# Patient Record
Sex: FEMALE | Race: BLACK OR AFRICAN AMERICAN | Employment: UNEMPLOYED | URBAN - METROPOLITAN AREA
[De-identification: names, ages, dates, MRNs, and addresses within clinical notes are randomized per-mention and may not be internally consistent; named-entity substitution may affect disease eponyms.]

---

## 2017-09-19 ENCOUNTER — HOSPITAL ENCOUNTER (EMERGENCY)
Age: 27
Discharge: HOME | End: 2017-09-19
Attending: INTERNAL MEDICINE | Admitting: INTERNAL MEDICINE
Payer: MEDICAID

## 2017-09-19 VITALS
BODY MASS INDEX: 25.19 KG/M2 | TEMPERATURE: 97.8 F | HEART RATE: 69 BPM | DIASTOLIC BLOOD PRESSURE: 64 MMHG | HEIGHT: 58 IN | SYSTOLIC BLOOD PRESSURE: 118 MMHG | WEIGHT: 120 LBS | OXYGEN SATURATION: 100 % | RESPIRATION RATE: 20 BRPM

## 2017-09-19 DIAGNOSIS — L30.9 ECZEMA, UNSPECIFIED TYPE: Primary | ICD-10-CM

## 2017-09-19 PROCEDURE — 99282 EMERGENCY DEPT VISIT SF MDM: CPT

## 2017-09-19 RX ORDER — PREDNISONE 20 MG/1
40 TABLET ORAL DAILY
Qty: 15 TAB | Refills: 0 | Status: SHIPPED | OUTPATIENT
Start: 2017-09-19 | End: 2017-09-29

## 2017-09-19 RX ORDER — LORATADINE 10 MG/1
TABLET ORAL
Qty: 14 TAB | Refills: 0 | Status: SHIPPED | OUTPATIENT
Start: 2017-09-19 | End: 2017-11-14

## 2017-09-19 NOTE — ED PROVIDER NOTES
[address]  EMERGENCY DEPARTMENT HISTORY AND PHYSICAL EXAM       Date: 9/19/2017   Patient Name: Radha Shetty   YOB: 1990  Medical Record Number: [id number]    History of Presenting Illness     Chief Complaint   Patient presents with    Skin Problem        History Provided By:  patient    Additional History: 4:24 PM  Radha Shetty is a 32 y.o. female with a PMHx of eczema (pt has had it her whole life, last dermatologist appointment was Dec 2015 last dermatologist appointment, dxed with scabies. P) who presents to the emergency department C/O worsening rash on the hands and feet. Her eczema sxs are worsening, extra dry, burning sensation (especially with water contact), and cracking skin. Sxs are exaccerbated by cold, sweat, certain detergents. 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 Pt rarely takes Benadryl for the sxs because it makes her drowsy, and was given topical cream for the sxs. Pt uses thick lotions, vaseline, and body butter for moisturizer. Patient denies nausea, vomiting, diarrhea, fever, chills, cough, and any other symptoms or complaints. Primary Care Provider: Angela Hernandez MD   Specialist:    Past History     Past Medical History:   Past Medical History:   Diagnosis Date    Anemia         Past Surgical History:   History reviewed. No pertinent surgical history. Family History:   Family History   Problem Relation Age of Onset    Asthma Sister     Diabetes Maternal Aunt     Lung Disease Maternal Uncle     Stroke Maternal Grandfather         Social History:   Social History   Substance Use Topics    Smoking status: Former Smoker     Quit date: 5/22/2015    Smokeless tobacco: Never Used    Alcohol use No        Allergies: Allergies   Allergen Reactions    Codeine Rash        Review of Systems   Review of Systems   Constitutional: Negative for chills and fever. HENT: Positive for congestion. Respiratory: Negative for cough. Gastrointestinal: Negative for diarrhea, nausea and vomiting. Skin: Positive for rash (hands and feet, dry, burning sensation). All other systems reviewed and are negative. Physical Exam  Vitals:    09/19/17 1621   BP: 118/64   Pulse: 69   Resp: 20   Temp: 97.8 °F (36.6 °C)   SpO2: 100%   Weight: 54.4 kg (120 lb)   Height: 4' 10\" (1.473 m)       Physical Exam   Constitutional: She is oriented to person, place, and time. She appears well-developed and well-nourished. HENT:   Head: Normocephalic and atraumatic. Right Ear: External ear normal.   Left Ear: External ear normal.   Nose: Nose normal.   Mouth/Throat: Oropharynx is clear and moist.   Eyes: Conjunctivae and EOM are normal. Pupils are equal, round, and reactive to light. Right eye exhibits no discharge. Left eye exhibits no discharge. No scleral icterus. Neck: Normal range of motion. Neck supple. No JVD present. No tracheal deviation present. Cardiovascular: Normal rate, regular rhythm, normal heart sounds and intact distal pulses. Pulmonary/Chest: Effort normal and breath sounds normal.   Abdominal: Soft. Bowel sounds are normal. She exhibits no distension. There is no tenderness. No HSM   Musculoskeletal: Normal range of motion. Neurological: She is alert and oriented to person, place, and time. She has normal reflexes. No focal motor weakness. Skin: Skin is warm and dry. Rash (dyshydrotic, thick and scaly skin) noted. There is erythema (bilateral eyelids, no scaling). Rash mostly in the fingers same with feet and toes, scattered slightly in the LE and trunk. Psychiatric: She has a normal mood and affect. Her behavior is normal.   Nursing note and vitals reviewed. Diagnostic Study Results     Labs -    No results found for this or any previous visit (from the past 12 hour(s)).     Radiologic Studies -  The following have been ordered and reviewed:  No orders to display Medical Decision Making   I am the first provider for this patient. I reviewed the vital signs, available nursing notes, past medical history, past surgical history, family history and social history. Vital Signs-Reviewed the patient's vital signs. Patient Vitals for the past 12 hrs:   Temp Pulse Resp BP SpO2   09/19/17 1621 97.8 °F (36.6 °C) 69 20 118/64 100 %       Pulse Oximetry Analysis - Normal 100% on RA     DDx:   Chronic eczema, skin rash, contact dermatitis; doubt acute infection, scabies    Procedures:   Procedures    ED Course:  4:24 PM  Initial assessment performed. The patients presenting problems have been discussed, and they are in agreement with the care plan formulated and outlined with them. I have encouraged them to ask questions as they arise throughout their visit. Medications Given in the ED:  Medications - No data to display    DISCHARGE NOTE:  4:41 PM  Rogers Barr's  results have been reviewed with her. She has been counseled regarding her diagnosis, treatment, and plan. She verbally conveys understanding and agreement of the signs, symptoms, diagnosis, treatment and prognosis and additionally agrees to follow up as discussed. She also agrees with the care-plan and conveys that all of her questions have been answered. I have also provided discharge instructions for her that include: educational information regarding their diagnosis and treatment, and list of reasons why they would want to return to the ED prior to their follow-up appointment, should her condition change. Diagnosis   Clinical Impression:   1.  Eczema, unspecified type           Follow-up Information     Follow up With Details Comments Contact Info    Your Dermatologist Schedule an appointment as soon as possible for a visit As needed, for Dermatology follow up     [id number] [place] Schedule an appointment as soon as possible for a visit in 2 days For GP follow up [address] [place], [address]    [facility] EMERGENCY DEPT  As needed, If symptoms worsen [address] [place] [id number] [phone]          Current Discharge Medication List      START taking these medications    Details   predniSONE (DELTASONE) 20 mg tablet Take 2 Tabs by mouth daily for 10 doses. With Breakfast  Qty: 15 Tab, Refills: 0      loratadine (CLARITIN) 10 mg tablet Take 1 tab by mouth daily for seven (7) days. Continue after 7 days only if symptoms are still present. Restart for 7 day intervals if sinus congestion symptoms return. Qty: 14 Tab, Refills: 0             _______________________________   Attestations: This note is prepared by Isabella Winston, acting as a Scribe for Mckenzie Alarcon MD on 4:24 PM on 9/19/2017 . Mckenzie Alarcon MD: The scribe's documentation has been prepared under my direction and personally reviewed by me in its entirety.   _______________________________

## 2017-09-19 NOTE — DISCHARGE INSTRUCTIONS

## 2017-11-14 ENCOUNTER — HOSPITAL ENCOUNTER (EMERGENCY)
Age: 27
Discharge: HOME | End: 2017-11-14
Attending: EMERGENCY MEDICINE
Payer: MEDICAID

## 2017-11-14 VITALS
WEIGHT: 130 LBS | TEMPERATURE: 97.6 F | DIASTOLIC BLOOD PRESSURE: 111 MMHG | BODY MASS INDEX: 27.29 KG/M2 | OXYGEN SATURATION: 100 % | RESPIRATION RATE: 20 BRPM | HEART RATE: 75 BPM | HEIGHT: 58 IN | SYSTOLIC BLOOD PRESSURE: 144 MMHG

## 2017-11-14 DIAGNOSIS — R11.2 NAUSEA AND VOMITING, INTRACTABILITY OF VOMITING NOT SPECIFIED, UNSPECIFIED VOMITING TYPE: ICD-10-CM

## 2017-11-14 DIAGNOSIS — F10.10 ALCOHOL ABUSE, EPISODIC DRINKING BEHAVIOR: ICD-10-CM

## 2017-11-14 DIAGNOSIS — K29.00 ACUTE GASTRITIS WITHOUT HEMORRHAGE, UNSPECIFIED GASTRITIS TYPE: Primary | ICD-10-CM

## 2017-11-14 LAB
ALBUMIN SERPL-MCNC: 4.6 G/DL (ref 3.4–5)
ALBUMIN/GLOB SERPL: 1.3 {RATIO} (ref 0.8–1.7)
ALP SERPL-CCNC: 56 U/L (ref 45–117)
ALT SERPL-CCNC: 44 U/L (ref 13–56)
ANION GAP SERPL CALC-SCNC: 18 MMOL/L (ref 3–18)
APPEARANCE UR: CLEAR
AST SERPL-CCNC: 42 U/L (ref 15–37)
BASOPHILS # BLD: 0 K/UL (ref 0–0.06)
BASOPHILS NFR BLD: 0 % (ref 0–2)
BILIRUB SERPL-MCNC: 0.4 MG/DL (ref 0.2–1)
BILIRUB UR QL: NEGATIVE
BUN SERPL-MCNC: 14 MG/DL (ref 7–18)
BUN/CREAT SERPL: 19 (ref 12–20)
CALCIUM SERPL-MCNC: 9.5 MG/DL (ref 8.5–10.1)
CHLORIDE SERPL-SCNC: 106 MMOL/L (ref 100–108)
CO2 SERPL-SCNC: 22 MMOL/L (ref 21–32)
COLOR UR: ABNORMAL
CREAT SERPL-MCNC: 0.73 MG/DL (ref 0.6–1.3)
DIFFERENTIAL METHOD BLD: ABNORMAL
EOSINOPHIL # BLD: 0 K/UL (ref 0–0.4)
EOSINOPHIL NFR BLD: 0 % (ref 0–5)
ERYTHROCYTE [DISTWIDTH] IN BLOOD BY AUTOMATED COUNT: 14.7 % (ref 11.6–14.5)
GLOBULIN SER CALC-MCNC: 3.6 G/DL (ref 2–4)
GLUCOSE SERPL-MCNC: 124 MG/DL (ref 74–99)
GLUCOSE UR STRIP.AUTO-MCNC: NEGATIVE MG/DL
HCG UR QL: NEGATIVE
HCG UR QL: NEGATIVE
HCT VFR BLD AUTO: 40.1 % (ref 35–45)
HGB BLD-MCNC: 14.2 G/DL (ref 12–16)
HGB UR QL STRIP: ABNORMAL
KETONES UR QL STRIP.AUTO: >80 MG/DL
LEUKOCYTE ESTERASE UR QL STRIP.AUTO: NEGATIVE
LIPASE SERPL-CCNC: 64 U/L (ref 73–393)
LYMPHOCYTES # BLD: 1.6 K/UL (ref 0.9–3.6)
LYMPHOCYTES NFR BLD: 9 % (ref 21–52)
MCH RBC QN AUTO: 28.2 PG (ref 24–34)
MCHC RBC AUTO-ENTMCNC: 35.4 G/DL (ref 31–37)
MCV RBC AUTO: 79.7 FL (ref 74–97)
MONOCYTES # BLD: 0.7 K/UL (ref 0.05–1.2)
MONOCYTES NFR BLD: 3 % (ref 3–10)
NEUTS SEG # BLD: 16.7 K/UL (ref 1.8–8)
NEUTS SEG NFR BLD: 88 % (ref 40–73)
NITRITE UR QL STRIP.AUTO: NEGATIVE
PH UR STRIP: 7.5 [PH] (ref 5–8)
PLATELET # BLD AUTO: 222 K/UL (ref 135–420)
PMV BLD AUTO: 10.1 FL (ref 9.2–11.8)
POTASSIUM SERPL-SCNC: 3.3 MMOL/L (ref 3.5–5.5)
PROT SERPL-MCNC: 8.2 G/DL (ref 6.4–8.2)
PROT UR STRIP-MCNC: 100 MG/DL
RBC # BLD AUTO: 5.03 M/UL (ref 4.2–5.3)
SODIUM SERPL-SCNC: 146 MMOL/L (ref 136–145)
SP GR UR REFRACTOMETRY: 0 (ref 1–1.03)
SP GR UR REFRACTOMETRY: 1.02 (ref 1–1.03)
UROBILINOGEN UR QL STRIP.AUTO: 0.2 EU/DL (ref 0.2–1)
WBC # BLD AUTO: 19 K/UL (ref 4.6–13.2)

## 2017-11-14 PROCEDURE — 96374 THER/PROPH/DIAG INJ IV PUSH: CPT

## 2017-11-14 PROCEDURE — 85025 COMPLETE CBC W/AUTO DIFF WBC: CPT | Performed by: EMERGENCY MEDICINE

## 2017-11-14 PROCEDURE — 96361 HYDRATE IV INFUSION ADD-ON: CPT

## 2017-11-14 PROCEDURE — 96375 TX/PRO/DX INJ NEW DRUG ADDON: CPT

## 2017-11-14 PROCEDURE — 81003 URINALYSIS AUTO W/O SCOPE: CPT | Performed by: EMERGENCY MEDICINE

## 2017-11-14 PROCEDURE — 80053 COMPREHEN METABOLIC PANEL: CPT | Performed by: EMERGENCY MEDICINE

## 2017-11-14 PROCEDURE — 81025 URINE PREGNANCY TEST: CPT | Performed by: EMERGENCY MEDICINE

## 2017-11-14 PROCEDURE — 83690 ASSAY OF LIPASE: CPT | Performed by: EMERGENCY MEDICINE

## 2017-11-14 PROCEDURE — 74011250636 HC RX REV CODE- 250/636: Performed by: EMERGENCY MEDICINE

## 2017-11-14 PROCEDURE — 99283 EMERGENCY DEPT VISIT LOW MDM: CPT

## 2017-11-14 RX ORDER — PROCHLORPERAZINE EDISYLATE 5 MG/ML
10 INJECTION INTRAMUSCULAR; INTRAVENOUS
Status: COMPLETED | OUTPATIENT
Start: 2017-11-14 | End: 2017-11-14

## 2017-11-14 RX ORDER — PROMETHAZINE HYDROCHLORIDE 25 MG/1
25 TABLET ORAL
Qty: 12 TAB | Refills: 0 | Status: SHIPPED | OUTPATIENT
Start: 2017-11-14

## 2017-11-14 RX ORDER — DIPHENHYDRAMINE HYDROCHLORIDE 50 MG/ML
25 INJECTION, SOLUTION INTRAMUSCULAR; INTRAVENOUS ONCE
Status: COMPLETED | OUTPATIENT
Start: 2017-11-14 | End: 2017-11-14

## 2017-11-14 RX ADMIN — DIPHENHYDRAMINE HYDROCHLORIDE 25 MG: 50 INJECTION, SOLUTION INTRAMUSCULAR; INTRAVENOUS at 16:43

## 2017-11-14 RX ADMIN — PROCHLORPERAZINE EDISYLATE 10 MG: 5 INJECTION INTRAMUSCULAR; INTRAVENOUS at 16:44

## 2017-11-14 RX ADMIN — SODIUM CHLORIDE 2000 ML: 900 INJECTION, SOLUTION INTRAVENOUS at 16:42

## 2017-11-14 NOTE — ED PROVIDER NOTES
[address]  EMERGENCY DEPARTMENT HISTORY AND PHYSICAL EXAM       Date: 11/14/2017   Patient Name: Stephy Juarez   YOB: 1990  Medical Record Number: [id number]    History of Presenting Illness     Chief Complaint   Patient presents with    Vomiting        History Provided By:  patient    Additional History:   4:12 PM  Stephy Juarez is a 32 y.o. female presenting to the ED C/O multiple episodes of n/v, onset last night s/p drinking \"too much beer at my friend's birthday party. \" Associated sxs include abd pain and cramping in bilateral hands. Improved with IVF and anti nausea meds. Worse after EtOH last night. No hx of same. Occassional drainking. Pain is crampy and off and on and mild. LNMP 11/5/17. Pt denies fever, chills, everyday EtOH use, and any other symptoms or complaints. Primary Care Provider: Angela Hernandez, MD   Specialist:    Past History     Past Medical History:   Past Medical History:   Diagnosis Date    Anemia         Past Surgical History:   History reviewed. No pertinent surgical history. Family History:   Family History   Problem Relation Age of Onset    Asthma Sister     Diabetes Maternal Aunt     Lung Disease Maternal Uncle     Stroke Maternal Grandfather         Social History:   Social History   Substance Use Topics    Smoking status: Former Smoker     Quit date: 5/22/2015    Smokeless tobacco: Never Used    Alcohol use No        Allergies: Allergies   Allergen Reactions    Codeine Rash        Review of Systems   Review of Systems   Constitutional: Negative for chills and fever. Gastrointestinal: Positive for abdominal pain, nausea and vomiting. Genitourinary: Negative for dysuria and flank pain. Musculoskeletal: Positive for myalgias (bilateral hand cramping). Neurological: Negative for weakness and headaches. All other systems reviewed and are negative.       Physical Exam  Vitals:    11/14/17 1542   BP: (!) 144/111   Pulse: 75 Resp: 20   Temp: 97.6 °F (36.4 °C)   SpO2: 100%   Weight: 59 kg (130 lb)   Height: 4' 10\" (1.473 m)       Physical Exam   Nursing note and vitals reviewed. Vital signs and nursing notes reviewed    CONSTITUTIONAL: Alert, in no apparent distress; well-developed; well-nourished. Smiling, joking. HEAD:  Normocephalic, atraumatic  EYES: PERRL; EOM's intact. ENTM: Nose: no rhinorrhea; Throat: no erythema or exudate, mucous membranes moist  Neck:  No JVD, supple without lymphadenopathy  RESP: Chest clear, equal breath sounds. CV: S1 and S2 WNL; No murmurs, gallops or rubs. GI: Normal bowel sounds, abdomen soft. Mild epigastric TTP. No masses or organomegaly. : No costo-vertebral angle tenderness. BACK:  Non-tender  UPPER EXT:  Normal inspection  LOWER EXT: No edema, no calf tenderness. Distal pulses intact. NEURO: CN intact, reflexes 2/4 and sym, strength 5/5 and sym, sensation intact. SKIN: No rashes; Normal for age and stage. PSYCH:  Alert and oriented, normal affect.     Diagnostic Study Results     Labs -      Recent Results (from the past 12 hour(s))   URINALYSIS W/ RFLX MICROSCOPIC    Collection Time: 11/14/17  4:25 PM   Result Value Ref Range    Color YELLOW      Appearance CLEAR      Specific gravity 1.020 1.005 - 1.030      Specific gravity 0.000 (L) 1.003 - 1.030      pH (UA) 7.5 5.0 - 8.0      Protein 100 (A) NEG mg/dL    Glucose NEGATIVE  NEG mg/dL    Ketone >=160 NEG mg/dL    Bilirubin NEGATIVE  NEG      Blood SMALL (A) NEG      Urobilinogen 0.2 0.2 - 1.0 EU/dL    Nitrites NEGATIVE  NEG      Leukocyte Esterase NEGATIVE  NEG     HCG URINE, QL    Collection Time: 11/14/17  4:25 PM   Result Value Ref Range    HCG urine, Ql. NEGATIVE  NEG     CBC WITH AUTOMATED DIFF    Collection Time: 11/14/17  4:30 PM   Result Value Ref Range    WBC 19.0 (H) 4.6 - 13.2 K/uL    RBC 5.03 4.20 - 5.30 M/uL    HGB 14.2 12.0 - 16.0 g/dL    HCT 40.1 35.0 - 45.0 %    MCV 79.7 74.0 - 97.0 FL    MCH 28.2 24.0 - 34.0 PG    MCHC 35.4 31.0 - 37.0 g/dL    RDW 14.7 (H) 11.6 - 14.5 %    PLATELET 459 940 - 616 K/uL    MPV 10.1 9.2 - 11.8 FL    NEUTROPHILS 88 (H) 40 - 73 %    LYMPHOCYTES 9 (L) 21 - 52 %    MONOCYTES 3 3 - 10 %    EOSINOPHILS 0 0 - 5 %    BASOPHILS 0 0 - 2 %    ABS. NEUTROPHILS 16.7 (H) 1.8 - 8.0 K/UL    ABS. LYMPHOCYTES 1.6 0.9 - 3.6 K/UL    ABS. MONOCYTES 0.7 0.05 - 1.2 K/UL    ABS. EOSINOPHILS 0.0 0.0 - 0.4 K/UL    ABS. BASOPHILS 0.0 0.0 - 0.06 K/UL    DF AUTOMATED     METABOLIC PANEL, COMPREHENSIVE    Collection Time: 11/14/17  4:30 PM   Result Value Ref Range    Sodium 146 (H) 136 - 145 mmol/L    Potassium 3.3 (L) 3.5 - 5.5 mmol/L    Chloride 106 100 - 108 mmol/L    CO2 22 21 - 32 mmol/L    Anion gap 18 3.0 - 18 mmol/L    Glucose 124 (H) 74 - 99 mg/dL    BUN 14 7.0 - 18 MG/DL    Creatinine 0.73 0.6 - 1.3 MG/DL    BUN/Creatinine ratio 19 12 - 20      GFR est AA >60 >60 ml/min/1.73m2    GFR est non-AA >60 >60 ml/min/1.73m2    Calcium 9.5 8.5 - 10.1 MG/DL    Bilirubin, total 0.4 0.2 - 1.0 MG/DL    ALT (SGPT) 44 13 - 56 U/L    AST (SGOT) 42 (H) 15 - 37 U/L    Alk. phosphatase 56 45 - 117 U/L    Protein, total 8.2 6.4 - 8.2 g/dL    Albumin 4.6 3.4 - 5.0 g/dL    Globulin 3.6 2.0 - 4.0 g/dL    A-G Ratio 1.3 0.8 - 1.7     LIPASE    Collection Time: 11/14/17  4:30 PM   Result Value Ref Range    Lipase 64 (L) 73 - 393 U/L   HCG URINE, QL. - POC    Collection Time: 11/14/17  4:36 PM   Result Value Ref Range    Pregnancy test,urine (POC) NEGATIVE  NEG         Radiologic Studies -  No orders to display        Medical Decision Making   I am the first provider for this patient. I reviewed the vital signs, available nursing notes, past medical history, past surgical history, family history and social history. Vital Signs-Reviewed the patient's vital signs.    Patient Vitals for the past 12 hrs:   Temp Pulse Resp BP SpO2   11/14/17 1542 97.6 °F (36.4 °C) 75 20 (!) 144/111 100 %       Pulse Oximetry Analysis - Normal 100% on RA. No intervention needed. ED Course:     4:12 PM Initial assessment performed. The patients presenting problems have been discussed, and they are in agreement with the care plan formulated and outlined with them. I have encouraged them to ask questions as they arise throughout their visit. 5:27 PM Pt is feeling better, requesting food. Pt is ready for d/c. Medications Given in the ED:  Medications   sodium chloride 0.9 % bolus infusion 2,000 mL (2,000 mL IntraVENous New Bag 11/14/17 1642)   prochlorperazine (COMPAZINE) injection 10 mg (10 mg IntraVENous Given 11/14/17 1644)   diphenhydrAMINE (BENADRYL) injection 25 mg (25 mg IntraVENous Given 11/14/17 1643)        Discharge Note:  5:27 PM  Patients results have been reviewed with them. Patient and/or family have verbally conveyed their understanding and agreement of the patient's signs, symptoms, diagnosis, treatment and prognosis and additionally agree to follow up as recommended or return to the Emergency Room should their condition change prior to their follow-up appointment. Patient verbally agrees with the care-plan and verbally conveys that all of their questions have been answered. Discharge instructions have also been provided to the patient with some educational information regarding their diagnosis as well a list of reasons why they would want to return to the ER prior to their follow-up appointment should their condition change. Diagnosis   Clinical Impression:   1. Acute gastritis without hemorrhage, unspecified gastritis type    2.  Nausea and vomiting, intractability of vomiting not specified, unspecified vomiting type           Follow-up Information     Follow up With Details Comments Contact Info    Your PCP Schedule an appointment as soon as possible for a visit in 2 days      [facility] EMERGENCY DEPT  As needed, If symptoms worsen [address] [id number]  [phone]          Current Discharge Medication List START taking these medications    Details   promethazine (PHENERGAN) 25 mg tablet Take 1 Tab by mouth every six (6) hours as needed for Nausea. Caution: This medication may make you drowsy. Avoid driving while under the influence of this medication. Qty: 12 Tab, Refills: 0           Pain free at discharge without TTP. Electrolytes wnl. No signs of hepatitis or pancreatitis. Return prec given. Patient comfortable with outpatient treatment  _______________________________   Attestations:     SCRIBE ATTESTATION:  This note is prepared by Darcy Payne, acting as Scribe for Anne Urban MD.    PROVIDER ATTESTATION:  Anne Urban MD  The scribe's documentation has been prepared under my direction and personally reviewed by me in its entirety.  I confirm that the note above accurately reflects all work, treatment, procedures, and medical decision making performed by me.   _______________________________

## 2017-11-14 NOTE — ED TRIAGE NOTES
Patient states that she was drinking last night and is dehydrated. Patient actively dry heaving in triage. Patient actively hyperventilating and screaming in triage. Sepsis Screening completed    (  )Patient meets SIRS criteria. ( x )Patient does not meet SIRS criteria.       SIRS Criteria is achieved when two or more of the following are present   Temperature < 96.8°F (36°C) or > 100.9°F (38.3°C)   Heart Rate > 90 beats per minute   Respiratory Rate > 20 breaths per minute   WBC count > 12,000 or <4,000 or > 10% bands

## 2017-11-14 NOTE — DISCHARGE INSTRUCTIONS
Gastritis: Care Instructions  Your Care Instructions    Gastritis is a sore and upset stomach. It happens when something irritates the stomach lining. Many things can cause it. These include an infection such as the flu or something you ate or drank. Medicines or a sore on the lining of the stomach (ulcer) also can cause it. Your belly may bloat and ache. You may belch, vomit, and feel sick to your stomach. You should be able to relieve the problem by taking medicine. And it may help to change your diet. If gastritis lasts, your doctor may prescribe medicine. Follow-up care is a key part of your treatment and safety. Be sure to make and go to all appointments, and call your doctor if you are having problems. It's also a good idea to know your test results and keep a list of the medicines you take. How can you care for yourself at home? · If your doctor prescribed antibiotics, take them as directed. Do not stop taking them just because you feel better. You need to take the full course of antibiotics. · Be safe with medicines. If your doctor prescribed medicine to decrease stomach acid, take it as directed. Call your doctor if you think you are having a problem with your medicine. · Do not take any other medicine, including over-the-counter pain relievers, without talking to your doctor first.  · If your doctor recommends over-the-counter medicine to reduce stomach acid, such as Pepcid AC, Prilosec, Tagamet HB, or Zantac 75, follow the directions on the label. · Drink plenty of fluids (enough so that your urine is light yellow or clear like water) to prevent dehydration. Choose water and other caffeine-free clear liquids. If you have kidney, heart, or liver disease and have to limit fluids, talk with your doctor before you increase the amount of fluids you drink. · Limit how much alcohol you drink. · Avoid coffee, tea, cola drinks, chocolate, and other foods with caffeine.  They increase stomach acid.  When should you call for help? Call 911 anytime you think you may need emergency care. For example, call if:  ? · You vomit blood or what looks like coffee grounds. ? · You pass maroon or very bloody stools. ?Call your doctor now or seek immediate medical care if:  ? · You start breathing fast and have not produced urine in the last 8 hours. ? · You cannot keep fluids down. ? Watch closely for changes in your health, and be sure to contact your doctor if:  ? · You do not get better as expected. Where can you learn more? Go to http://lisa-shen.info/. Enter [id number] in the search box to learn more about \"Gastritis: Care Instructions. \"  Current as of: May 12, 2017  Content Version: 11.4  © 4302-4245 [employer]. Care instructions adapted under license by [employer] (which disclaims liability or warranty for this information). If you have questions about a medical condition or this instruction, always ask your healthcare professional. [address] any warranty or liability for your use of this information.